# Patient Record
Sex: MALE | Race: WHITE
[De-identification: names, ages, dates, MRNs, and addresses within clinical notes are randomized per-mention and may not be internally consistent; named-entity substitution may affect disease eponyms.]

---

## 2017-12-31 ENCOUNTER — HOSPITAL ENCOUNTER (EMERGENCY)
Dept: HOSPITAL 12 - ER | Age: 22
Discharge: HOME | End: 2017-12-31
Payer: COMMERCIAL

## 2017-12-31 VITALS — HEIGHT: 74 IN | BODY MASS INDEX: 25.67 KG/M2 | WEIGHT: 200 LBS

## 2017-12-31 VITALS — SYSTOLIC BLOOD PRESSURE: 119 MMHG | DIASTOLIC BLOOD PRESSURE: 89 MMHG

## 2017-12-31 DIAGNOSIS — R11.10: Primary | ICD-10-CM

## 2017-12-31 DIAGNOSIS — J45.909: ICD-10-CM

## 2017-12-31 DIAGNOSIS — F12.90: ICD-10-CM

## 2017-12-31 DIAGNOSIS — T40.7X5A: ICD-10-CM

## 2017-12-31 DIAGNOSIS — F17.200: ICD-10-CM
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
## 2017-12-31 LAB
ALP SERPL-CCNC: 81 U/L (ref 50–136)
ALT SERPL W/O P-5'-P-CCNC: 34 U/L (ref 16–63)
AST SERPL-CCNC: 53 U/L (ref 15–37)
BASOPHILS # BLD AUTO: 0.1 K/UL (ref 0–8)
BASOPHILS NFR BLD AUTO: 0.6 % (ref 0–2)
BILIRUB DIRECT SERPL-MCNC: 0.2 MG/DL (ref 0–0.2)
BILIRUB SERPL-MCNC: 2.2 MG/DL (ref 0.2–1)
BUN SERPL-MCNC: 17 MG/DL (ref 7–18)
CHLORIDE SERPL-SCNC: 102 MMOL/L (ref 98–107)
CO2 SERPL-SCNC: 26 MMOL/L (ref 21–32)
CREAT SERPL-MCNC: 1.3 MG/DL (ref 0.6–1.3)
EOSINOPHIL # BLD AUTO: 0 K/UL (ref 0–0.7)
EOSINOPHIL NFR BLD AUTO: 0 % (ref 0–7)
GLUCOSE SERPL-MCNC: 98 MG/DL (ref 74–106)
HCT VFR BLD AUTO: 43.6 % (ref 36.7–47.1)
HGB BLD-MCNC: 15.4 G/DL (ref 12.5–16.3)
LIPASE SERPL-CCNC: 95 U/L (ref 73–393)
LYMPHOCYTES # BLD AUTO: 1.5 K/UL (ref 20–40)
LYMPHOCYTES NFR BLD AUTO: 13.4 % (ref 20.5–51.5)
MCH RBC QN AUTO: 32.5 UUG (ref 23.8–33.4)
MCHC RBC AUTO-ENTMCNC: 35 G/DL (ref 32.5–36.3)
MCV RBC AUTO: 92.1 FL (ref 73–96.2)
MONOCYTES # BLD AUTO: 0.5 K/UL (ref 2–10)
MONOCYTES NFR BLD AUTO: 4.1 % (ref 0–11)
NEUTROPHILS # BLD AUTO: 9.2 K/UL (ref 1.8–8.9)
NEUTROPHILS NFR BLD AUTO: 81.9 % (ref 38.5–71.5)
PLATELET # BLD AUTO: 245 K/UL (ref 152–348)
POTASSIUM SERPL-SCNC: 4.7 MMOL/L (ref 3.5–5.1)
RBC # BLD AUTO: 4.74 MIL/UL (ref 4.06–5.63)
WBC # BLD AUTO: 11.2 K/UL (ref 3.6–10.2)
WS STN SPEC: 7.6 G/DL (ref 6.4–8.2)

## 2017-12-31 PROCEDURE — 96361 HYDRATE IV INFUSION ADD-ON: CPT

## 2017-12-31 PROCEDURE — 85730 THROMBOPLASTIN TIME PARTIAL: CPT

## 2017-12-31 PROCEDURE — A4663 DIALYSIS BLOOD PRESSURE CUFF: HCPCS

## 2017-12-31 PROCEDURE — 85025 COMPLETE CBC W/AUTO DIFF WBC: CPT

## 2017-12-31 PROCEDURE — 80076 HEPATIC FUNCTION PANEL: CPT

## 2017-12-31 PROCEDURE — 99284 EMERGENCY DEPT VISIT MOD MDM: CPT

## 2017-12-31 PROCEDURE — 96374 THER/PROPH/DIAG INJ IV PUSH: CPT

## 2017-12-31 PROCEDURE — 80048 BASIC METABOLIC PNL TOTAL CA: CPT

## 2017-12-31 PROCEDURE — 83690 ASSAY OF LIPASE: CPT

## 2017-12-31 NOTE — NUR
Patient discharged to home in stable conditon.  Written and verbal after care 
instructions given. 

Patient verbalizes understanding of instructions.

pt left Er w/ steady gait accompained by father.

## 2019-02-28 ENCOUNTER — HOSPITAL ENCOUNTER (EMERGENCY)
Dept: HOSPITAL 12 - ER | Age: 24
Discharge: HOME | End: 2019-02-28
Payer: COMMERCIAL

## 2019-02-28 VITALS — HEIGHT: 74 IN | BODY MASS INDEX: 27.59 KG/M2 | WEIGHT: 215 LBS

## 2019-02-28 VITALS — DIASTOLIC BLOOD PRESSURE: 72 MMHG | SYSTOLIC BLOOD PRESSURE: 129 MMHG
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
## 2019-02-28 DIAGNOSIS — Z79.899: ICD-10-CM

## 2019-02-28 DIAGNOSIS — S00.03XA: ICD-10-CM

## 2019-02-28 DIAGNOSIS — S51.012A: Primary | ICD-10-CM
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
## 2019-02-28 DIAGNOSIS — J45.909: ICD-10-CM

## 2019-02-28 DIAGNOSIS — F14.10: ICD-10-CM

## 2019-02-28 DIAGNOSIS — F17.200: ICD-10-CM

## 2019-02-28 PROCEDURE — A4663 DIALYSIS BLOOD PRESSURE CUFF: HCPCS

## 2019-03-14 ENCOUNTER — HOSPITAL ENCOUNTER (EMERGENCY)
Dept: HOSPITAL 12 - ER | Age: 24
Discharge: HOME | End: 2019-03-14
Payer: COMMERCIAL

## 2019-03-14 VITALS — WEIGHT: 220 LBS | HEIGHT: 74 IN | BODY MASS INDEX: 28.23 KG/M2

## 2019-03-14 VITALS — DIASTOLIC BLOOD PRESSURE: 94 MMHG | SYSTOLIC BLOOD PRESSURE: 150 MMHG

## 2019-03-14 DIAGNOSIS — F17.200: ICD-10-CM

## 2019-03-14 DIAGNOSIS — Z79.899: ICD-10-CM
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
## 2019-03-14 DIAGNOSIS — F12.10: ICD-10-CM

## 2019-03-14 DIAGNOSIS — S51.012D: Primary | ICD-10-CM

## 2019-03-14 DIAGNOSIS — J45.909: ICD-10-CM

## 2019-03-14 PROCEDURE — A4217 STERILE WATER/SALINE, 500 ML: HCPCS

## 2019-03-14 PROCEDURE — A4663 DIALYSIS BLOOD PRESSURE CUFF: HCPCS
